# Patient Record
Sex: FEMALE | URBAN - METROPOLITAN AREA
[De-identification: names, ages, dates, MRNs, and addresses within clinical notes are randomized per-mention and may not be internally consistent; named-entity substitution may affect disease eponyms.]

---

## 2024-02-27 ENCOUNTER — DAC (OUTPATIENT)
Dept: URBAN - METROPOLITAN AREA SURGERY CENTER 8 | Facility: SURGERY CENTER | Age: 59
End: 2024-02-27

## 2024-08-28 ENCOUNTER — DASHBOARD ENCOUNTERS (OUTPATIENT)
Age: 59
End: 2024-08-28

## 2024-08-28 ENCOUNTER — OFFICE VISIT (OUTPATIENT)
Dept: URBAN - METROPOLITAN AREA CLINIC 12 | Facility: CLINIC | Age: 59
End: 2024-08-28
Payer: COMMERCIAL

## 2024-08-28 VITALS
HEIGHT: 68 IN | BODY MASS INDEX: 24.98 KG/M2 | DIASTOLIC BLOOD PRESSURE: 72 MMHG | HEART RATE: 73 BPM | SYSTOLIC BLOOD PRESSURE: 112 MMHG | WEIGHT: 164.8 LBS

## 2024-08-28 DIAGNOSIS — R14.0 BLOATING: ICD-10-CM

## 2024-08-28 DIAGNOSIS — K21.9 GERD WITHOUT ESOPHAGITIS: ICD-10-CM

## 2024-08-28 DIAGNOSIS — R11.0 NAUSEA: ICD-10-CM

## 2024-08-28 PROBLEM — 266435005: Status: ACTIVE | Noted: 2024-08-28

## 2024-08-28 PROBLEM — 116289008: Status: ACTIVE | Noted: 2024-08-28

## 2024-08-28 PROBLEM — 422587007: Status: ACTIVE | Noted: 2024-08-28

## 2024-08-28 PROCEDURE — 99214 OFFICE O/P EST MOD 30 MIN: CPT

## 2024-08-28 RX ORDER — OMEPRAZOLE 40 MG/1
1 CAPSULE 30 MINUTES BEFORE MORNING MEAL CAPSULE, DELAYED RELEASE ORAL ONCE A DAY
Qty: 90 | Refills: 0 | OUTPATIENT
Start: 2024-08-28

## 2024-08-28 RX ORDER — FAMOTIDINE 20 MG
1 CAPSULE TABLET ORAL
Status: ACTIVE | COMMUNITY

## 2024-08-28 NOTE — HPI-TODAY'S VISIT:
Pt is a 60 yo female presents for nausea. States she's been having nausea throughtout the day for over a month. Her symptoms are triggered by heavy cream/ice cream/other dairy, gluten, alcohol, and high-fat foods. Has been trying different diets (Whole30 elimination diet) and finding triggers. Noticed inceased bloating with beans. C/o intermittent acid reflux and heartburn. Reports hx of depression, seeing a counselor and wonders if it's playing a part in her symptoms. Denies dysphagia, odynophagia, vomiting, abd pain or melena.  _____________________________________ Last colon with Dr. Lopez on 2/2024: unremarkable other than a small IH. Repeat in 5 years.

## 2025-08-14 ENCOUNTER — OFFICE VISIT (OUTPATIENT)
Dept: URBAN - METROPOLITAN AREA CLINIC 12 | Facility: CLINIC | Age: 60
End: 2025-08-14
Payer: COMMERCIAL

## 2025-08-14 DIAGNOSIS — R19.7 ACUTE DIARRHEA: ICD-10-CM

## 2025-08-14 DIAGNOSIS — K90.49 FOOD INTOLERANCE: ICD-10-CM

## 2025-08-14 PROBLEM — 409966000: Status: ACTIVE | Noted: 2025-08-14

## 2025-08-14 PROBLEM — 235719002: Status: ACTIVE | Noted: 2025-08-14

## 2025-08-14 PROCEDURE — 99213 OFFICE O/P EST LOW 20 MIN: CPT
